# Patient Record
Sex: MALE | Race: WHITE | URBAN - NONMETROPOLITAN AREA
[De-identification: names, ages, dates, MRNs, and addresses within clinical notes are randomized per-mention and may not be internally consistent; named-entity substitution may affect disease eponyms.]

---

## 2017-02-06 ENCOUNTER — OFFICE VISIT (OUTPATIENT)
Dept: FAMILY MEDICINE | Facility: OTHER | Age: 27
End: 2017-02-06
Payer: COMMERCIAL

## 2017-02-06 VITALS
WEIGHT: 156.5 LBS | SYSTOLIC BLOOD PRESSURE: 124 MMHG | HEART RATE: 68 BPM | BODY MASS INDEX: 23.72 KG/M2 | DIASTOLIC BLOOD PRESSURE: 60 MMHG | RESPIRATION RATE: 16 BRPM | HEIGHT: 68 IN | TEMPERATURE: 97.9 F

## 2017-02-06 DIAGNOSIS — F33.0 MAJOR DEPRESSIVE DISORDER, RECURRENT EPISODE, MILD (H): ICD-10-CM

## 2017-02-06 DIAGNOSIS — F41.9 ANXIETY: Primary | ICD-10-CM

## 2017-02-06 PROCEDURE — 99213 OFFICE O/P EST LOW 20 MIN: CPT | Performed by: PHYSICIAN ASSISTANT

## 2017-02-06 RX ORDER — CITALOPRAM HYDROBROMIDE 40 MG/1
40 TABLET ORAL DAILY
Qty: 90 TABLET | Refills: 1 | Status: SHIPPED | OUTPATIENT
Start: 2017-02-06

## 2017-02-06 RX ORDER — AMITRIPTYLINE HYDROCHLORIDE 10 MG/1
TABLET ORAL
Qty: 90 TABLET | Refills: 0 | Status: SHIPPED
Start: 2017-02-06 | End: 2017-02-27

## 2017-02-06 ASSESSMENT — PAIN SCALES - GENERAL: PAINLEVEL: NO PAIN (0)

## 2017-02-06 NOTE — MR AVS SNAPSHOT
"              After Visit Summary   2017    Byron Chavez    MRN: [id number]           Patient Information     Date Of Birth          1990        Visit Information        Provider Department      2017 3:00 PM Sumanth Toro PA-C [facility]        Today's Diagnoses     Anxiety    -  1     Major depressive disorder, recurrent episode, mild (H)            Follow-ups after your visit        Additional Services     PSYCHOLOGY REFERRAL       Help with coping.    [phone]                  Who to contact     If you have questions or need follow up information about today's clinic visit or your schedule please contact [facility] directly at [phone].  Normal or non-critical lab and imaging results will be communicated to you by MyChart, letter or phone within 4 business days after the clinic has received the results. If you do not hear from us within 7 days, please contact the clinic through MyChart or phone. If you have a critical or abnormal lab result, we will notify you by phone as soon as possible.  Submit refill requests through [employer] or call your pharmacy and they will forward the refill request to us. Please allow 3 business days for your refill to be completed.          Additional Information About Your Visit        MyChart Information     RealD lets you send messages to your doctor, view your test results, renew your prescriptions, schedule appointments and more. To sign up, go to [email or web address] . Click on \"Log in\" on the left side of the screen, which will take you to the Welcome page. Then click on \"Sign up Now\" on the right side of the page.     You will be asked to enter the access code listed below, as well as some personal information. Please follow the directions to create your username and password.     Your access code is: [email or web address]  Expires: 2017  3:06 PM     Your access code will  in 90 days. If you need help or a new code, please " "call your [facility] or [phone].        Care EveryWhere ID     This is your Care EveryWhere ID. This could be used by other organizations to access your [facility] medical records  [id number]        Your Vitals Were     Pulse Temperature Respirations Height BMI (Body Mass Index)       68 97.9  F (36.6  C) (Oral) 16 5' 8.1\" (1.73 m) 23.72 kg/m2        Blood Pressure from Last 3 Encounters:   02/06/17 124/60   08/24/16 124/60   03/23/16 122/70    Weight from Last 3 Encounters:   02/06/17 156 lb 8 oz (70.988 kg)   08/24/16 161 lb 8 oz (73.256 kg)   03/23/16 182 lb 14.4 oz (82.963 kg)              We Performed the Following     PSYCHOLOGY REFERRAL          Today's Medication Changes          These changes are accurate as of: 2/6/17  3:06 PM.  If you have any questions, ask your nurse or doctor.               Start taking these medicines.        Dose/Directions    amitriptyline 10 MG tablet   Commonly known as:  ELAVIL   Used for:  Major depressive disorder, recurrent episode, mild (H), Anxiety   Started by:  Sumanth Toro PA-C        Start at 1 tab at bedtime for 3 days, then 2 tabs at bedtime for 3 days, then 3 tabs at bedtime.  Plan to increase dose to 50-75 mg at bedtime after 1 month   Quantity:  90 tablet   Refills:  0         These medicines have changed or have updated prescriptions.        Dose/Directions    citalopram 40 MG tablet   Commonly known as:  celeXA   This may have changed:  Another medication with the same name was removed. Continue taking this medication, and follow the directions you see here.   Used for:  Major depressive disorder, recurrent episode, mild (H)   Changed by:  Sumanth Toro PA-C        Dose:  40 mg   Take 1 tablet (40 mg) by mouth daily   Quantity:  90 tablet   Refills:  1            Where to get your medicines      These medications were sent to [facility] [place], [place] - [address]  [address] , [facility] [address]     Phone:  [phone]    - " amitriptyline 10 MG tablet  - citalopram 40 MG tablet             Primary Care Provider Office Phone # Fax #    Sumanth Toro PA-C [phone]       [facility] [address] [facility] [address]        Thank you!     Thank you for choosing [facility]  for your care. Our goal is always to provide you with excellent care. Hearing back from our patients is one way we can continue to improve our services. Please take a few minutes to complete the written survey that you may receive in the mail after your visit with us. Thank you!             Your Updated Medication List - Protect others around you: Learn how to safely use, store and throw away your medicines at www.disposemymeds.org.          This list is accurate as of: 2/6/17  3:06 PM.  Always use your most recent med list.                   Brand Name Dispense Instructions for use    amitriptyline 10 MG tablet    ELAVIL    90 tablet    Start at 1 tab at bedtime for 3 days, then 2 tabs at bedtime for 3 days, then 3 tabs at bedtime.  Plan to increase dose to 50-75 mg at bedtime after 1 month       citalopram 40 MG tablet    celeXA    90 tablet    Take 1 tablet (40 mg) by mouth daily

## 2017-02-06 NOTE — NURSING NOTE
"Chief Complaint   Patient presents with     Depression       Initial /60 mmHg  Pulse 68  Temp(Src) 97.9  F (36.6  C) (Oral)  Resp 16  Ht 5' 8.1\" (1.73 m)  Wt 156 lb 8 oz (70.988 kg)  BMI 23.72 kg/m2 Estimated body mass index is 23.72 kg/(m^2) as calculated from the following:    Height as of this encounter: 5' 8.1\" (1.73 m).    Weight as of this encounter: 156 lb 8 oz (70.988 kg).  Medication Reconciliation: complete     Janna KELLER LPN      Regular blood pressure cuff    Screening Questionnaire for Adult Immunization    Are you sick today?   No   Do you have allergies to medications, food, a vaccine component or latex?   No   Have you ever had a serious reaction after receiving a vaccination?   No   Do you have a long-term health problem with heart disease, lung disease, asthma, kidney disease, metabolic disease (e.g. diabetes), anemia, or other blood disorder?   No   Do you have cancer, leukemia, HIV/AIDS, or any other immune system problem?   Yes   In the past 3 months, have you taken medications that affect  your immune system, such as prednisone, other steroids, or anticancer drugs; drugs for the treatment of rheumatoid arthritis, Crohn s disease, or psoriasis; or have you had radiation treatments?   No   Have you had a seizure, or a brain or other nervous system problem?   No   During the past year, have you received a transfusion of blood or blood     products, or been given immune (gamma) globulin or antiviral drug?   No   For women: Are you pregnant or is there a chance you could become        pregnant during the next month?   No   Have you received any vaccinations in the past 4 weeks?   No     Immunization questionnaire was positive for at least one answer.  Notified Sumanth Carmona PA-C.      MNVFC doesn't apply on this patient    Per orders of  , injection of  given by Janna Roe. Patient instructed to remain in clinic for 20 minutes afterwards, and to report any adverse reaction to me " immediately.       Screening performed by Janna Roe on 2/6/2017 at 3:00 PM.

## 2017-02-06 NOTE — PROGRESS NOTES
"  SUBJECTIVE:                                                    Byron Chavez is a 26 year old male who presents to clinic today for the following health issues:        Depression and Anxiety Follow-Up    Status since last visit: Worsened stress in the home    Other associated symptoms:    Complicating factors:     Significant life event: Yes-  HIV positive and step father dying from COPD related issues.     Current substance abuse: denies    PHQ-9 SCORE 8/24/2016 8/30/2016 2/6/2017   Total Score 1 2 5     No flowsheet data found.     PHQ-9  English      PHQ-9   Any Language     GAD7         Amount of exercise or physical activity: None    Problems taking medications regularly: No    Medication side effects: none    Diet: regular (no restrictions)  Problem list and histories reviewed & adjusted, as indicated.  Additional history: as documented    Patient Active Problem List   Diagnosis     Major depressive disorder, recurrent episode, mild (H)     HIV positive (H)     Hyperlipidemia LDL goal <160     Ego-dystonic sexual orientation     History reviewed. No pertinent past surgical history.    Social History   Substance Use Topics     Smoking status: Former Smoker     Smokeless tobacco: Not on file     Alcohol Use: Yes      Comment: occasional     Family History   Problem Relation Age of Onset     Hypertension Mother      Colon Cancer Maternal Grandmother      Colon Cancer Other            ROS:  Constitutional, HEENT, cardiovascular, pulmonary, gi and gu systems are negative, except as otherwise noted.    OBJECTIVE:                                                    /60 mmHg  Pulse 68  Temp(Src) 97.9  F (36.6  C) (Oral)  Resp 16  Ht 5' 8.1\" (1.73 m)  Wt 156 lb 8 oz (70.988 kg)  BMI 23.72 kg/m2  Body mass index is 23.72 kg/(m^2).  GENERAL: healthy, alert and no distress  HENT: ear canals and TM's normal, nose and mouth without ulcers or lesions  NECK: no adenopathy, no asymmetry, masses, or scars and " trachea midline and normal to palpation  RESP: lungs clear to auscultation - no rales, rhonchi or wheezes  CV: regular rate and rhythm, normal S1 S2, no S3 or S4, no murmur, click or rub, no peripheral edema and peripheral pulses strong  ABDOMEN: soft, nontender, no hepatosplenomegaly, no masses and bowel sounds normal  MS: no gross musculoskeletal defects noted, no edema  Psychological:   Negative for memory, mood or flight of ideas at this time with appropriate affect.  Good recall of date, time and place.    Diagnostic Test Results:  No results found for this or any previous visit (from the past 24 hour(s)).     ASSESSMENT/PLAN:                                                    Byron was seen today for depression.    Diagnoses and all orders for this visit:    Anxiety  -     amitriptyline (ELAVIL) 10 MG tablet; Start at 1 tab at bedtime for 3 days, then 2 tabs at bedtime for 3 days, then 3 tabs at bedtime.  Plan to increase dose to 50-75 mg at bedtime after 1 month  -     PSYCHOLOGY REFERRAL    Major depressive disorder, recurrent episode, mild (H)  -     amitriptyline (ELAVIL) 10 MG tablet; Start at 1 tab at bedtime for 3 days, then 2 tabs at bedtime for 3 days, then 3 tabs at bedtime.  Plan to increase dose to 50-75 mg at bedtime after 1 month  -     PSYCHOLOGY REFERRAL  -     citalopram (CELEXA) 40 MG tablet; Take 1 tablet (40 mg) by mouth daily    ROV 4-6 weeks advised.  Increased nutrition attention encouraged.  Sumanth Carmona PA-C  [facility]

## 2017-02-07 ASSESSMENT — PATIENT HEALTH QUESTIONNAIRE - PHQ9: SUM OF ALL RESPONSES TO PHQ QUESTIONS 1-9: 5

## 2017-02-14 PROBLEM — F41.9 ANXIETY: Status: ACTIVE | Noted: 2017-02-14

## 2017-02-27 DIAGNOSIS — F41.9 ANXIETY: ICD-10-CM

## 2017-02-27 DIAGNOSIS — F33.0 MAJOR DEPRESSIVE DISORDER, RECURRENT EPISODE, MILD (H): ICD-10-CM

## 2017-02-27 NOTE — TELEPHONE ENCOUNTER
I attempted to call this patient to see what dose he is taking.  There was no answer on his phone.    Elavil     Last Written Prescription Date: 2-6-2017  Last Fill Quantity: 90, # refills: 0  Last Office Visit with [facility], [facility] or [facility] prescribing provider: 2-6-2017   Next 5 appointments (look out 90 days)     Mar 06, 2017  1:40 PM CST   Office Visit with Sumanth Toro PA-C   [facility]    [address] [facility] [address] [phone]                   BP Readings from Last 3 Encounters:   02/06/17 124/60   08/24/16 124/60   03/23/16 122/70     Last PHQ-9 score on record=   PHQ-9 SCORE 2/6/2017   Total Score 5

## 2017-02-28 RX ORDER — AMITRIPTYLINE HYDROCHLORIDE 10 MG/1
TABLET ORAL
Qty: 90 TABLET | Refills: 0 | Status: SHIPPED
Start: 2017-02-28

## 2017-02-28 NOTE — TELEPHONE ENCOUNTER
Routing refill request to provider for review/approval because:  PHQ-9 >4    Nella Palmer, RN  [facility]

## 2017-03-06 NOTE — TELEPHONE ENCOUNTER
Patient called back. He is going to follow up with his previous psychiatrist. He will continue to follow up with him. Wondering if he can get a refill to get him to his appt with his original provider which is on March 20th. He uses [facility] pharmacy. He can be reached at [phone] if you have any questions.   Thank you,  Elif Beltran   for [facility]

## 2017-04-12 ENCOUNTER — ALLIED HEALTH/NURSE VISIT (OUTPATIENT)
Dept: FAMILY MEDICINE | Facility: OTHER | Age: 27
End: 2017-04-12
Payer: COMMERCIAL

## 2017-04-12 DIAGNOSIS — A52.8 LATE SYPHILIS, LATENT: Primary | ICD-10-CM

## 2017-04-12 PROCEDURE — 99207 ZZC NO CHARGE NURSE ONLY: CPT

## 2017-04-12 PROCEDURE — 96372 THER/PROPH/DIAG INJ SC/IM: CPT

## 2017-04-12 NOTE — MR AVS SNAPSHOT
"              After Visit Summary   2017    Byron Chavez    MRN: [id number]           Patient Information     Date Of Birth          1990        Visit Information        Provider Department      2017 2:00 PM MARGARET DUMONT NURSE, [facility]        Today's Diagnoses     Late syphilis, latent    -  1       Follow-ups after your visit        Who to contact     If you have questions or need follow up information about today's clinic visit or your schedule please contact [facility] directly at [phone].  Normal or non-critical lab and imaging results will be communicated to you by MyChart, letter or phone within 4 business days after the clinic has received the results. If you do not hear from us within 7 days, please contact the clinic through YouOShart or phone. If you have a critical or abnormal lab result, we will notify you by phone as soon as possible.  Submit refill requests through [employer] or call your pharmacy and they will forward the refill request to us. Please allow 3 business days for your refill to be completed.          Additional Information About Your Visit        MyChart Information     [employer] lets you send messages to your doctor, view your test results, renew your prescriptions, schedule appointments and more. To sign up, go to [email or web address] . Click on \"Log in\" on the left side of the screen, which will take you to the Welcome page. Then click on \"Sign up Now\" on the right side of the page.     You will be asked to enter the access code listed below, as well as some personal information. Please follow the directions to create your username and password.     Your access code is: [email or web address]  Expires: 2017  4:06 PM     Your access code will  in 90 days. If you need help or a new code, please call your [facility] or [phone].        Care EveryWhere ID     This is your Care EveryWhere ID. This could be used by other organizations to " access your [facility] medical records  [id number]         Blood Pressure from Last 3 Encounters:   02/06/17 124/60   08/24/16 124/60   03/23/16 122/70    Weight from Last 3 Encounters:   02/06/17 156 lb 8 oz (71 kg)   08/24/16 161 lb 8 oz (73.3 kg)   03/23/16 182 lb 14.4 oz (83 kg)              We Performed the Following     INJECTION INTRAMUSCULAR OR SUB-Q        Primary Care Provider Office Phone # Fax #    Sumanth Toro PA-C [phone]       [facility] [address] [facility] [address]        Thank you!     Thank you for choosing [facility]  for your care. Our goal is always to provide you with excellent care. Hearing back from our patients is one way we can continue to improve our services. Please take a few minutes to complete the written survey that you may receive in the mail after your visit with us. Thank you!             Your Updated Medication List - Protect others around you: Learn how to safely use, store and throw away your medicines at www.disposemymeds.org.          This list is accurate as of: 4/12/17  2:20 PM.  Always use your most recent med list.                   Brand Name Dispense Instructions for use    amitriptyline 10 MG tablet    ELAVIL    90 tablet    Start at 1 tab at bedtime for 3 days, then 2 tabs at bedtime for 3 days, then 3 tabs at bedtime.  Plan to increase dose to 50-75 mg at bedtime after 1 month       citalopram 40 MG tablet    celeXA    90 tablet    Take 1 tablet (40 mg) by mouth daily       * penicillin G benzathine [id number] UNIT/2ML injection    BICILLIN L-A    2 mL    Inject 2 mLs (1.2 Million Units) into the muscle once for 1 dose       * BICILLIN L-A [id number] UNIT/2ML injection   Generic drug:  penicillin G benzathine     2 mL    Inject 2 mLs (1.2 Million Units) into the muscle once for 1 dose       * Notice:  This list has 2 medication(s) that are the same as other medications prescribed for you. Read the directions carefully, and  ask your doctor or other care provider to review them with you.

## 2017-04-12 NOTE — NURSING NOTE
The following medication was given:     MEDICATION: Bicillin LA 1.2 x2 ijections  See medication note.  Patient instructed to remain in clinic for 20 minutes afterwards, and to report any adverse reaction to me immediately.  Prior to injection verified patient identity using patient's name and date of birth.  Outside orders from [facility], Dr. Solorio, for Bilillin 2,400,000 once weekly for 3 weeks.  Verbally approved by Sumanth Carmona PA-C  Orders sent for scanning.  Leisa Milner MA     4/12/2017